# Patient Record
Sex: MALE | Race: BLACK OR AFRICAN AMERICAN | Employment: UNEMPLOYED | URBAN - METROPOLITAN AREA
[De-identification: names, ages, dates, MRNs, and addresses within clinical notes are randomized per-mention and may not be internally consistent; named-entity substitution may affect disease eponyms.]

---

## 2022-07-15 ENCOUNTER — HOSPITAL ENCOUNTER (EMERGENCY)
Age: 42
Discharge: HOME | End: 2022-07-15
Attending: EMERGENCY MEDICINE
Payer: MEDICAID

## 2022-07-15 VITALS
RESPIRATION RATE: 14 BRPM | TEMPERATURE: 97.3 F | SYSTOLIC BLOOD PRESSURE: 117 MMHG | HEIGHT: 75 IN | DIASTOLIC BLOOD PRESSURE: 67 MMHG | HEART RATE: 80 BPM | OXYGEN SATURATION: 99 % | WEIGHT: 183 LBS | BODY MASS INDEX: 22.75 KG/M2

## 2022-07-15 DIAGNOSIS — F41.0 PANIC ATTACK: ICD-10-CM

## 2022-07-15 DIAGNOSIS — R42 EPISODIC LIGHTHEADEDNESS: Primary | ICD-10-CM

## 2022-07-15 LAB
ANION GAP BLD CALC-SCNC: 10 MMOL/L (ref 10–20)
CA-I BLD-MCNC: 1.28 MMOL/L (ref 1.12–1.32)
CHLORIDE BLD-SCNC: 106 MMOL/L (ref 100–108)
CO2 BLD-SCNC: 27 MMOL/L (ref 19–24)
GLUCOSE BLD STRIP.AUTO-MCNC: 98 MG/DL (ref 74–106)
LACTATE BLD-SCNC: 0.7 MMOL/L (ref 0.4–2)
POTASSIUM BLD-SCNC: 3.8 MMOL/L (ref 3.5–5.5)
SODIUM BLD-SCNC: 142 MMOL/L (ref 136–145)

## 2022-07-15 PROCEDURE — 80047 BASIC METABLC PNL IONIZED CA: CPT

## 2022-07-15 PROCEDURE — 93005 ELECTROCARDIOGRAM TRACING: CPT

## 2022-07-15 PROCEDURE — 83605 ASSAY OF LACTIC ACID: CPT

## 2022-07-15 PROCEDURE — 99283 EMERGENCY DEPT VISIT LOW MDM: CPT
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
## 2022-07-16 LAB
ATRIAL RATE: 70 BPM
CALCULATED P AXIS, ECG09: 79 DEGREES
CALCULATED R AXIS, ECG10: 54 DEGREES
CALCULATED T AXIS, ECG11: 65 DEGREES
DIAGNOSIS, 93000: NORMAL
P-R INTERVAL, ECG05: 136 MS
Q-T INTERVAL, ECG07: 382 MS
QRS DURATION, ECG06: 96 MS
QTC CALCULATION (BEZET), ECG08: 412 MS
VENTRICULAR RATE, ECG03: 70 BPM

## 2022-07-16 NOTE — ED NOTES
Emergency Department Nursing Plan of Care       The Nursing Plan of Care is developed from the Nursing assessment and Emergency Department Attending provider initial evaluation. The plan of care may be reviewed in the ED Provider note.     The Plan of Care was developed with the following considerations:   Patient / Family readiness to learn indicated by:verbalized understanding  Persons(s) to be included in education: patient  Barriers to Learning/Limitations:No    Signed     Marisa Bales    7/15/2022   8:57 PM
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
